# Patient Record
Sex: MALE | Race: WHITE | Employment: OTHER | URBAN - METROPOLITAN AREA
[De-identification: names, ages, dates, MRNs, and addresses within clinical notes are randomized per-mention and may not be internally consistent; named-entity substitution may affect disease eponyms.]

---

## 2017-02-16 ENCOUNTER — HOSPITAL ENCOUNTER (OUTPATIENT)
Dept: OTHER | Age: 82
Discharge: HOME | End: 2017-02-16
Attending: PSYCHIATRY & NEUROLOGY | Admitting: PSYCHIATRY & NEUROLOGY

## 2017-02-16 ENCOUNTER — OFFICE VISIT (OUTPATIENT)
Dept: NEUROLOGY | Age: 82
End: 2017-02-16

## 2017-02-16 VITALS
SYSTOLIC BLOOD PRESSURE: 125 MMHG | WEIGHT: 172 LBS | BODY MASS INDEX: 25.48 KG/M2 | HEIGHT: 69 IN | DIASTOLIC BLOOD PRESSURE: 59 MMHG | HEART RATE: 74 BPM

## 2017-02-16 DIAGNOSIS — G50.0 TRIGEMINAL NEURALGIA: ICD-10-CM

## 2017-02-16 DIAGNOSIS — G50.0 TRIGEMINAL NEURALGIA: Primary | ICD-10-CM

## 2017-02-16 LAB
ALT SERPL-CCNC: 36 U/L (ref 10–40)
AST SERPL-CCNC: 24 U/L (ref 15–37)
BASOPHILS ABSOLUTE: 0 K/UL (ref 0–0.2)
BASOPHILS RELATIVE PERCENT: 0.7 %
EOSINOPHILS ABSOLUTE: 0.2 K/UL (ref 0–0.6)
EOSINOPHILS RELATIVE PERCENT: 3.8 %
HCT VFR BLD CALC: 37.4 % (ref 40.5–52.5)
HEMOGLOBIN: 12.4 G/DL (ref 13.5–17.5)
LYMPHOCYTES ABSOLUTE: 1.2 K/UL (ref 1–5.1)
LYMPHOCYTES RELATIVE PERCENT: 20.2 %
MCH RBC QN AUTO: 32.1 PG (ref 26–34)
MCHC RBC AUTO-ENTMCNC: 33.1 G/DL (ref 31–36)
MCV RBC AUTO: 96.8 FL (ref 80–100)
MONOCYTES ABSOLUTE: 0.4 K/UL (ref 0–1.3)
MONOCYTES RELATIVE PERCENT: 6.5 %
NEUTROPHILS ABSOLUTE: 4.2 K/UL (ref 1.7–7.7)
NEUTROPHILS RELATIVE PERCENT: 68.8 %
PDW BLD-RTO: 13.4 % (ref 12.4–15.4)
PLATELET # BLD: 182 K/UL (ref 135–450)
PMV BLD AUTO: 8.4 FL (ref 5–10.5)
RBC # BLD: 3.87 M/UL (ref 4.2–5.9)
WBC # BLD: 6.1 K/UL (ref 4–11)

## 2017-02-16 PROCEDURE — G8484 FLU IMMUNIZE NO ADMIN: HCPCS | Performed by: PSYCHIATRY & NEUROLOGY

## 2017-02-16 PROCEDURE — 1036F TOBACCO NON-USER: CPT | Performed by: PSYCHIATRY & NEUROLOGY

## 2017-02-16 PROCEDURE — 1123F ACP DISCUSS/DSCN MKR DOCD: CPT | Performed by: PSYCHIATRY & NEUROLOGY

## 2017-02-16 PROCEDURE — 99213 OFFICE O/P EST LOW 20 MIN: CPT | Performed by: PSYCHIATRY & NEUROLOGY

## 2017-02-16 PROCEDURE — G8427 DOCREV CUR MEDS BY ELIG CLIN: HCPCS | Performed by: PSYCHIATRY & NEUROLOGY

## 2017-02-16 PROCEDURE — G8420 CALC BMI NORM PARAMETERS: HCPCS | Performed by: PSYCHIATRY & NEUROLOGY

## 2017-02-16 PROCEDURE — 4040F PNEUMOC VAC/ADMIN/RCVD: CPT | Performed by: PSYCHIATRY & NEUROLOGY

## 2017-02-16 RX ORDER — CARBAMAZEPINE 200 MG/1
TABLET ORAL
Qty: 270 TABLET | Refills: 3 | Status: SHIPPED | OUTPATIENT
Start: 2017-02-16 | End: 2017-09-11 | Stop reason: DRUGHIGH

## 2018-08-29 ENCOUNTER — OFFICE VISIT (OUTPATIENT)
Dept: ORTHOPEDIC SURGERY | Age: 83
End: 2018-08-29

## 2018-08-29 VITALS
WEIGHT: 163 LBS | BODY MASS INDEX: 24.14 KG/M2 | SYSTOLIC BLOOD PRESSURE: 118 MMHG | DIASTOLIC BLOOD PRESSURE: 77 MMHG | HEIGHT: 69 IN

## 2018-08-29 DIAGNOSIS — M48.061 DEGENERATIVE LUMBAR SPINAL STENOSIS: Primary | ICD-10-CM

## 2018-08-29 DIAGNOSIS — M54.16 LEFT LUMBAR RADICULITIS: ICD-10-CM

## 2018-08-29 DIAGNOSIS — E11.42 DIABETIC PERIPHERAL NEUROPATHY ASSOCIATED WITH TYPE 2 DIABETES MELLITUS (HCC): ICD-10-CM

## 2018-08-29 DIAGNOSIS — M54.50 LUMBAR PAIN: ICD-10-CM

## 2018-08-29 DIAGNOSIS — G95.19 NEUROGENIC CLAUDICATION (HCC): ICD-10-CM

## 2018-08-29 PROCEDURE — 1036F TOBACCO NON-USER: CPT | Performed by: INTERNAL MEDICINE

## 2018-08-29 PROCEDURE — G8420 CALC BMI NORM PARAMETERS: HCPCS | Performed by: INTERNAL MEDICINE

## 2018-08-29 PROCEDURE — G8427 DOCREV CUR MEDS BY ELIG CLIN: HCPCS | Performed by: INTERNAL MEDICINE

## 2018-08-29 PROCEDURE — 1101F PT FALLS ASSESS-DOCD LE1/YR: CPT | Performed by: INTERNAL MEDICINE

## 2018-08-29 PROCEDURE — 4040F PNEUMOC VAC/ADMIN/RCVD: CPT | Performed by: INTERNAL MEDICINE

## 2018-08-29 PROCEDURE — 99203 OFFICE O/P NEW LOW 30 MIN: CPT | Performed by: INTERNAL MEDICINE

## 2018-08-29 PROCEDURE — 1123F ACP DISCUSS/DSCN MKR DOCD: CPT | Performed by: INTERNAL MEDICINE

## 2018-08-29 RX ORDER — CILOSTAZOL 100 MG/1
100 TABLET ORAL
COMMUNITY
Start: 2018-01-22

## 2018-08-29 RX ORDER — GLIPIZIDE 5 MG/1
TABLET, FILM COATED, EXTENDED RELEASE ORAL
COMMUNITY
Start: 2018-01-14

## 2018-08-29 RX ORDER — TAMSULOSIN HYDROCHLORIDE 0.4 MG/1
CAPSULE ORAL
COMMUNITY
Start: 2018-01-10

## 2018-08-29 RX ORDER — TRAMADOL HYDROCHLORIDE 50 MG/1
50 TABLET ORAL
COMMUNITY
Start: 2018-08-28 | End: 2018-09-07 | Stop reason: SDUPTHER

## 2018-08-29 RX ORDER — METHYLPREDNISOLONE 4 MG/1
TABLET ORAL
Qty: 1 KIT | Refills: 0 | Status: SHIPPED | OUTPATIENT
Start: 2018-08-29

## 2018-08-29 NOTE — PROGRESS NOTES
tablet 1    diclofenac (VOLTAREN) 75 MG EC tablet       levothyroxine (SYNTHROID) 75 MCG tablet       lisinopril (PRINIVIL;ZESTRIL) 10 MG tablet       ASPIRIN by Does not apply route.  SIMVASTATIN PO Take  by mouth. No current facility-administered medications for this visit. Allergies:      No Known Allergies     Social History:         Social History     Social History    Marital status:      Spouse name: N/A    Number of children: N/A    Years of education: N/A     Occupational History    Not on file. Social History Main Topics    Smoking status: Former Smoker    Smokeless tobacco: Not on file    Alcohol use Yes      Comment: rarely    Drug use: No    Sexual activity: Not on file     Other Topics Concern    Not on file     Social History Narrative    No narrative on file        Review of Symptoms:    Pertinent items are noted in HPI    Review of systems reviewed from Patient History Form dated on today's date and   available in the patient's chart under the Media tab. Vital Signs:      Vitals:    08/29/18 1415   BP: 118/77        General Exam:     Constitutional: Patient is adequately groomed with no evidence of malnutrition  Mental Status: The patient is oriented to time, place and person. The patient's mood and affect are appropriate. Vascular: Examination reveals no swelling or calf tenderness. Peripheral pulses are palpable and 2+. Lymphatics: no lymphadenopathy of the inguinal region or lower extremity      Physical Exam: lower back      Primary Exam:    Inspection:  Loss of lordosis no atrophy or appreciable curvature      Palpation:  No focal trigger point tenderness      Range of Motion:  8885/5      Strength:  Normal lower extremity      Special Tests:  Negative SLR      Skin: There are no rashes, ulcerations or lesions.       Gait: minimally antalgic      Reflex intact lower     Additional Comments:        Additional Examinations:           Right PANEL/\">  Specimen: Blood - Blood, Venous\")'>216 (H) View Detailed Result Report  BASIC METABOLIC PANEL  14/69/8755\")' href=\"[email or web address] METABOLIC PANEL/\">  Specimen: Blood - Blood, Venous\")'>200 (H)         Other Outside Imaging and Testing Personally Reviewed:              Assessment   Impression: . Encounter Diagnoses   Name Primary?  Degenerative lumbar spinal stenosis Yes    Neurogenic claudication     Left lumbar radiculitis     Diabetic peripheral neuropathy associated with type 2 diabetes mellitus (HCC)     Lumbar pain               Plan: Activity modification cautioned against overuse   trial of steroids-Medrol Dosepak for hopeful therapeutic benefit  Caution with respect to hypoglycemia as a potential side effect of steroid-notify M.D. For blood sugar over 300  MRI evaluation of the lumbar spine and consider him a candidate for lumbar spine intervention injection/epidural when necessary if symptoms persist show no improvement or worsen    Consider formal course of PT when necessary  The nature of the finding, probable diagnosis and likely treatment was thoroughly discussed with the patient. The options, risks, complications, alternative treatment as well as some of the differential diagnosis was discussed. The patient was thoroughly informed and all questions were answered. the patient indicated understanding and satisfaction with the discussion. Orders:        Orders Placed This Encounter   Procedures    XR LUMBAR SPINE (2-3 VIEWS)           Disclaimer: \"This note was dictated with voice recognition software. Though review and correction are routine, we apologize for any errors. \"

## 2018-09-07 ENCOUNTER — TELEPHONE (OUTPATIENT)
Dept: ORTHOPEDIC SURGERY | Age: 83
End: 2018-09-07

## 2018-09-07 DIAGNOSIS — M48.061 DEGENERATIVE LUMBAR SPINAL STENOSIS: Primary | ICD-10-CM

## 2018-09-07 RX ORDER — TRAMADOL HYDROCHLORIDE 50 MG/1
50 TABLET ORAL EVERY 6 HOURS PRN
Qty: 20 TABLET | Refills: 0 | Status: SHIPPED | OUTPATIENT
Start: 2018-09-07 | End: 2018-09-14

## 2018-09-07 NOTE — TELEPHONE ENCOUNTER
Mr. Breezy Leone is having inceased pain at night, he moved his appt to Monday, and would like a refill on tramadol

## 2018-09-10 ENCOUNTER — OFFICE VISIT (OUTPATIENT)
Dept: ORTHOPEDIC SURGERY | Age: 83
End: 2018-09-10

## 2018-09-10 VITALS — WEIGHT: 163 LBS | BODY MASS INDEX: 24.14 KG/M2 | HEIGHT: 69 IN

## 2018-09-10 DIAGNOSIS — G95.19 NEUROGENIC CLAUDICATION (HCC): ICD-10-CM

## 2018-09-10 DIAGNOSIS — E11.42 DIABETIC PERIPHERAL NEUROPATHY ASSOCIATED WITH TYPE 2 DIABETES MELLITUS (HCC): ICD-10-CM

## 2018-09-10 DIAGNOSIS — M48.061 DEGENERATIVE LUMBAR SPINAL STENOSIS: Primary | ICD-10-CM

## 2018-09-10 DIAGNOSIS — M54.50 LUMBAR PAIN: ICD-10-CM

## 2018-09-10 DIAGNOSIS — M54.16 LEFT LUMBAR RADICULITIS: ICD-10-CM

## 2018-09-10 PROCEDURE — G8420 CALC BMI NORM PARAMETERS: HCPCS | Performed by: INTERNAL MEDICINE

## 2018-09-10 PROCEDURE — 1123F ACP DISCUSS/DSCN MKR DOCD: CPT | Performed by: INTERNAL MEDICINE

## 2018-09-10 PROCEDURE — G8427 DOCREV CUR MEDS BY ELIG CLIN: HCPCS | Performed by: INTERNAL MEDICINE

## 2018-09-10 PROCEDURE — 1036F TOBACCO NON-USER: CPT | Performed by: INTERNAL MEDICINE

## 2018-09-10 PROCEDURE — 1101F PT FALLS ASSESS-DOCD LE1/YR: CPT | Performed by: INTERNAL MEDICINE

## 2018-09-10 PROCEDURE — 99213 OFFICE O/P EST LOW 20 MIN: CPT | Performed by: INTERNAL MEDICINE

## 2018-09-10 PROCEDURE — 4040F PNEUMOC VAC/ADMIN/RCVD: CPT | Performed by: INTERNAL MEDICINE

## 2018-09-11 ENCOUNTER — TELEPHONE (OUTPATIENT)
Dept: ORTHOPEDIC SURGERY | Age: 83
End: 2018-09-11

## 2018-09-11 DIAGNOSIS — M54.50 LUMBAR PAIN: Primary | ICD-10-CM

## 2018-09-11 NOTE — TELEPHONE ENCOUNTER
patient called stating he cannot get MRI due to pacemaker, wanting to know what to do at the moment.

## 2018-09-12 ENCOUNTER — TELEPHONE (OUTPATIENT)
Dept: ORTHOPEDIC SURGERY | Age: 83
End: 2018-09-12

## 2018-09-12 NOTE — PROGRESS NOTES
facility-administered medications for this visit. Allergies:      No Known Allergies        Review of Systems:    Pertinent items are noted in HPI       Vital Signs: There were no vitals filed for this visit. General Exam:     Constitutional: Patient is adequately groomed with no evidence of malnutrition    Physical Exam: lower back      Primary Exam:    Inspection:  No deformity atrophy or appreciable curvature      Palpation:  No focal trigger point tenderness      Range of Motion:  80-85/5      Strength:  Normal lower extremity      Special Tests:  Negative SLR      Skin: There are no rashes, ulcerations or lesions. Gait: nonantalgic     Neurovascular - non focal and intact       Additional Comments:        Additional Examinations:                   Office Imaging Results/Procedures PerformedToday:           Office Procedures:     Orders Placed This Encounter   Procedures    MRI Lumbar Spine WO Contrast     Standing Status:   Future     Standing Expiration Date:   9/10/2019     Scheduling Instructions:      Zack Perez 9/11/18     Order Specific Question:   Reason for exam:     Answer:   r/o stenosis           Other Outside Imaging and Testing Personally Reviewed:       none          Assessment   Impression: . Encounter Diagnoses   Name Primary?     Degenerative lumbar spinal stenosis Yes    Neurogenic claudication     Left lumbar radiculitis     Diabetic peripheral neuropathy associated with type 2 diabetes mellitus (HCC)     Lumbar pain               Plan:       Continue medical pain management as per previous-when necessary use of tramadol  CT scan lumbar spine evaluate severity of spinal stenosis suspected clinically MRI contraindicated relative to pacemaker  Consider him a candidate for lumbar spine intervention injection-epidural pending results of CT scan       Orders:        Orders Placed This Encounter   Procedures    MRI Lumbar Spine WO Contrast     Standing Status:

## 2018-09-13 ENCOUNTER — TELEPHONE (OUTPATIENT)
Dept: ORTHOPEDIC SURGERY | Age: 83
End: 2018-09-13

## 2018-09-13 NOTE — TELEPHONE ENCOUNTER
called patient back letting him know we are okay with doing MRI, however [facility] will not do the MRI even with clearance from cardiologist but . [facility] may be able to do MRI but they are in need of make and model number to confirm. Explained to patient I reached out to his cardiologist Dr. Duke Prader to get that information and I would be in contact with him to let him know when we get the go ahead to do the MRI at [facility]

## 2018-09-19 ENCOUNTER — TELEPHONE (OUTPATIENT)
Dept: ORTHOPEDIC SURGERY | Age: 83
End: 2018-09-19

## 2018-09-19 NOTE — TELEPHONE ENCOUNTER
returned patients' call, let him know we were still waiting for Dr. Cammie Singleton office to fill out the paperwork to move forward with MRI.

## 2018-09-21 ENCOUNTER — TELEPHONE (OUTPATIENT)
Dept: ORTHOPEDIC SURGERY | Age: 83
End: 2018-09-21

## 2018-09-21 NOTE — TELEPHONE ENCOUNTER
Spoke with Dr. Mast Estimstephon office with [facility], since they haven't seen patient in multiple years and Dr. Kwabena Drake has taken over case, they are unable to fill out the proper paperwork for MRI

## 2018-09-21 NOTE — TELEPHONE ENCOUNTER
called Dr. Collette Plump office left a message on MA voicemail to give me a call back to figure out how we can get proper paperwork completed to get pts MRI done ASAP.

## 2018-09-24 ENCOUNTER — TELEPHONE (OUTPATIENT)
Dept: ORTHOPEDIC SURGERY | Age: 83
End: 2018-09-24

## 2018-09-24 NOTE — TELEPHONE ENCOUNTER
Julieta Neri from Dr. Linda Mena office left message on my voicemail stating they are waiting to receive the paperwork to fill out in order to move forward with the MRI

## 2018-09-25 ENCOUNTER — TELEPHONE (OUTPATIENT)
Dept: ORTHOPEDIC SURGERY | Age: 83
End: 2018-09-25

## 2018-09-25 NOTE — TELEPHONE ENCOUNTER
Ruthann Estrada. [place] left message on my voicemail confirming paperwork was correctly filled out by cardiologist Dr María Torres and we can move forward with MRI.  They will call patient to schedule that to be done

## 2018-09-28 ENCOUNTER — TELEPHONE (OUTPATIENT)
Dept: ORTHOPEDIC SURGERY | Age: 83
End: 2018-09-28

## 2018-10-03 ENCOUNTER — OFFICE VISIT (OUTPATIENT)
Dept: ORTHOPEDIC SURGERY | Age: 83
End: 2018-10-03
Payer: MEDICARE

## 2018-10-03 VITALS — WEIGHT: 160 LBS | BODY MASS INDEX: 23.7 KG/M2 | HEIGHT: 69 IN

## 2018-10-03 DIAGNOSIS — M48.061 DEGENERATIVE LUMBAR SPINAL STENOSIS: Primary | ICD-10-CM

## 2018-10-03 DIAGNOSIS — M54.16 LEFT LUMBAR RADICULITIS: ICD-10-CM

## 2018-10-03 DIAGNOSIS — E11.42 DIABETIC PERIPHERAL NEUROPATHY ASSOCIATED WITH TYPE 2 DIABETES MELLITUS (HCC): ICD-10-CM

## 2018-10-03 DIAGNOSIS — G95.19 NEUROGENIC CLAUDICATION (HCC): ICD-10-CM

## 2018-10-03 PROCEDURE — G8484 FLU IMMUNIZE NO ADMIN: HCPCS | Performed by: INTERNAL MEDICINE

## 2018-10-03 PROCEDURE — 1036F TOBACCO NON-USER: CPT | Performed by: INTERNAL MEDICINE

## 2018-10-03 PROCEDURE — 1101F PT FALLS ASSESS-DOCD LE1/YR: CPT | Performed by: INTERNAL MEDICINE

## 2018-10-03 PROCEDURE — 1123F ACP DISCUSS/DSCN MKR DOCD: CPT | Performed by: INTERNAL MEDICINE

## 2018-10-03 PROCEDURE — G8420 CALC BMI NORM PARAMETERS: HCPCS | Performed by: INTERNAL MEDICINE

## 2018-10-03 PROCEDURE — G8427 DOCREV CUR MEDS BY ELIG CLIN: HCPCS | Performed by: INTERNAL MEDICINE

## 2018-10-03 PROCEDURE — 99213 OFFICE O/P EST LOW 20 MIN: CPT | Performed by: INTERNAL MEDICINE

## 2018-10-03 PROCEDURE — 4040F PNEUMOC VAC/ADMIN/RCVD: CPT | Performed by: INTERNAL MEDICINE

## 2018-10-06 PROBLEM — G95.19 NEUROGENIC CLAUDICATION (HCC): Status: ACTIVE | Noted: 2018-10-06

## 2018-10-06 PROBLEM — M48.061 DEGENERATIVE LUMBAR SPINAL STENOSIS: Status: ACTIVE | Noted: 2018-10-06

## 2018-10-06 PROBLEM — E11.42 DIABETIC PERIPHERAL NEUROPATHY ASSOCIATED WITH TYPE 2 DIABETES MELLITUS (HCC): Status: ACTIVE | Noted: 2018-10-06

## 2018-10-06 PROBLEM — M54.16 LEFT LUMBAR RADICULITIS: Status: ACTIVE | Noted: 2018-10-06

## 2019-02-19 ENCOUNTER — TELEPHONE (OUTPATIENT)
Dept: ORTHOPEDIC SURGERY | Age: 84
End: 2019-02-19

## 2019-02-19 ENCOUNTER — OFFICE VISIT (OUTPATIENT)
Dept: ORTHOPEDIC SURGERY | Age: 84
End: 2019-02-19
Payer: MEDICARE

## 2019-02-19 DIAGNOSIS — M54.59 MECHANICAL LOW BACK PAIN: Primary | ICD-10-CM

## 2019-02-19 DIAGNOSIS — M48.061 DEGENERATIVE LUMBAR SPINAL STENOSIS: ICD-10-CM

## 2019-02-19 DIAGNOSIS — M51.36 DDD (DEGENERATIVE DISC DISEASE), LUMBAR: ICD-10-CM

## 2019-02-19 DIAGNOSIS — E11.42 DIABETIC PERIPHERAL NEUROPATHY ASSOCIATED WITH TYPE 2 DIABETES MELLITUS (HCC): ICD-10-CM

## 2019-02-19 PROCEDURE — 1123F ACP DISCUSS/DSCN MKR DOCD: CPT | Performed by: INTERNAL MEDICINE

## 2019-02-19 PROCEDURE — G8427 DOCREV CUR MEDS BY ELIG CLIN: HCPCS | Performed by: INTERNAL MEDICINE

## 2019-02-19 PROCEDURE — 99214 OFFICE O/P EST MOD 30 MIN: CPT | Performed by: INTERNAL MEDICINE

## 2019-02-19 PROCEDURE — 1101F PT FALLS ASSESS-DOCD LE1/YR: CPT | Performed by: INTERNAL MEDICINE

## 2019-02-19 PROCEDURE — 4040F PNEUMOC VAC/ADMIN/RCVD: CPT | Performed by: INTERNAL MEDICINE

## 2019-02-19 PROCEDURE — G8420 CALC BMI NORM PARAMETERS: HCPCS | Performed by: INTERNAL MEDICINE

## 2019-02-19 PROCEDURE — 1036F TOBACCO NON-USER: CPT | Performed by: INTERNAL MEDICINE

## 2019-02-19 PROCEDURE — L0642 LO SAG RI AN/POS PNL PRE OTS: HCPCS | Performed by: INTERNAL MEDICINE

## 2019-02-19 PROCEDURE — G8484 FLU IMMUNIZE NO ADMIN: HCPCS | Performed by: INTERNAL MEDICINE

## 2019-03-05 ENCOUNTER — TELEPHONE (OUTPATIENT)
Dept: ORTHOPEDIC SURGERY | Age: 84
End: 2019-03-05

## 2019-03-28 ENCOUNTER — OFFICE VISIT (OUTPATIENT)
Dept: NEUROLOGY | Age: 84
End: 2019-03-28
Payer: MEDICARE

## 2019-03-28 VITALS
DIASTOLIC BLOOD PRESSURE: 64 MMHG | WEIGHT: 162.4 LBS | BODY MASS INDEX: 24.05 KG/M2 | HEIGHT: 69 IN | HEART RATE: 59 BPM | SYSTOLIC BLOOD PRESSURE: 146 MMHG

## 2019-03-28 DIAGNOSIS — G50.0 TRIGEMINAL NEURALGIA: Primary | ICD-10-CM

## 2019-03-28 PROCEDURE — G8427 DOCREV CUR MEDS BY ELIG CLIN: HCPCS | Performed by: PSYCHIATRY & NEUROLOGY

## 2019-03-28 PROCEDURE — 1036F TOBACCO NON-USER: CPT | Performed by: PSYCHIATRY & NEUROLOGY

## 2019-03-28 PROCEDURE — G8484 FLU IMMUNIZE NO ADMIN: HCPCS | Performed by: PSYCHIATRY & NEUROLOGY

## 2019-03-28 PROCEDURE — 4040F PNEUMOC VAC/ADMIN/RCVD: CPT | Performed by: PSYCHIATRY & NEUROLOGY

## 2019-03-28 PROCEDURE — G8420 CALC BMI NORM PARAMETERS: HCPCS | Performed by: PSYCHIATRY & NEUROLOGY

## 2019-03-28 PROCEDURE — 99213 OFFICE O/P EST LOW 20 MIN: CPT | Performed by: PSYCHIATRY & NEUROLOGY

## 2019-03-28 PROCEDURE — 1123F ACP DISCUSS/DSCN MKR DOCD: CPT | Performed by: PSYCHIATRY & NEUROLOGY

## 2021-04-13 ENCOUNTER — OFFICE VISIT (OUTPATIENT)
Dept: NEUROLOGY | Age: 86
End: 2021-04-13
Payer: MEDICARE

## 2021-04-13 VITALS
BODY MASS INDEX: 24.29 KG/M2 | TEMPERATURE: 98.3 F | HEART RATE: 82 BPM | DIASTOLIC BLOOD PRESSURE: 75 MMHG | HEIGHT: 69 IN | WEIGHT: 164 LBS | SYSTOLIC BLOOD PRESSURE: 133 MMHG

## 2021-04-13 DIAGNOSIS — G50.0 TRIGEMINAL NEURALGIA: Primary | ICD-10-CM

## 2021-04-13 PROCEDURE — 99214 OFFICE O/P EST MOD 30 MIN: CPT | Performed by: PSYCHIATRY & NEUROLOGY

## 2021-04-13 PROCEDURE — G8420 CALC BMI NORM PARAMETERS: HCPCS | Performed by: PSYCHIATRY & NEUROLOGY

## 2021-04-13 PROCEDURE — 1036F TOBACCO NON-USER: CPT | Performed by: PSYCHIATRY & NEUROLOGY

## 2021-04-13 PROCEDURE — G8427 DOCREV CUR MEDS BY ELIG CLIN: HCPCS | Performed by: PSYCHIATRY & NEUROLOGY

## 2021-04-13 PROCEDURE — 1123F ACP DISCUSS/DSCN MKR DOCD: CPT | Performed by: PSYCHIATRY & NEUROLOGY

## 2021-04-13 PROCEDURE — 4040F PNEUMOC VAC/ADMIN/RCVD: CPT | Performed by: PSYCHIATRY & NEUROLOGY

## 2021-04-13 RX ORDER — CARBAMAZEPINE 200 MG/1
TABLET ORAL
Qty: 60 TABLET | Refills: 1 | Status: SHIPPED | OUTPATIENT
Start: 2021-04-13 | End: 2021-05-25 | Stop reason: DRUGHIGH

## 2021-04-15 ENCOUNTER — HOSPITAL ENCOUNTER (OUTPATIENT)
Age: 86
Discharge: HOME | End: 2021-04-15
Payer: MEDICARE

## 2021-04-15 ENCOUNTER — TELEPHONE (OUTPATIENT)
Dept: NEUROLOGY | Age: 86
End: 2021-04-15

## 2021-04-15 DIAGNOSIS — G50.0 TRIGEMINAL NEURALGIA: Primary | ICD-10-CM

## 2021-04-15 DIAGNOSIS — G50.0 TRIGEMINAL NEURALGIA: ICD-10-CM

## 2021-04-15 LAB
CARBAMAZEPINE DOSE: ABNORMAL
CARBAMAZEPINE LEVEL: 3.9 UG/ML (ref 4–12)

## 2021-04-15 PROCEDURE — 36415 COLL VENOUS BLD VENIPUNCTURE: CPT

## 2021-04-15 PROCEDURE — 80156 ASSAY CARBAMAZEPINE TOTAL: CPT

## 2021-04-15 NOTE — TELEPHONE ENCOUNTER
Talked to son  Rajwinder Cook and advised I put in another lab order and he wants it mailed to his house. Call complete.

## 2021-04-15 NOTE — TELEPHONE ENCOUNTER
Pt was seen in office yesterday and was given an order to have lab work done in 5 weeks. The Pt miss understood and went and had the lab work done today. The PT's son would like to know if he can get another lab order to have the blood work done in 5 weeks. Call Pt's son and advise.

## 2021-05-11 LAB — CARBAMAZEPINE LEVEL: 6.6 UG/ML (ref 4–12)

## 2021-05-25 ENCOUNTER — OFFICE VISIT (OUTPATIENT)
Dept: NEUROLOGY | Age: 86
End: 2021-05-25
Payer: MEDICARE

## 2021-05-25 VITALS
SYSTOLIC BLOOD PRESSURE: 145 MMHG | WEIGHT: 164 LBS | HEIGHT: 69 IN | BODY MASS INDEX: 24.29 KG/M2 | HEART RATE: 69 BPM | DIASTOLIC BLOOD PRESSURE: 69 MMHG

## 2021-05-25 DIAGNOSIS — G50.0 TRIGEMINAL NEURALGIA: Primary | ICD-10-CM

## 2021-05-25 PROCEDURE — 1036F TOBACCO NON-USER: CPT | Performed by: PSYCHIATRY & NEUROLOGY

## 2021-05-25 PROCEDURE — 4040F PNEUMOC VAC/ADMIN/RCVD: CPT | Performed by: PSYCHIATRY & NEUROLOGY

## 2021-05-25 PROCEDURE — 99213 OFFICE O/P EST LOW 20 MIN: CPT | Performed by: PSYCHIATRY & NEUROLOGY

## 2021-05-25 PROCEDURE — G8420 CALC BMI NORM PARAMETERS: HCPCS | Performed by: PSYCHIATRY & NEUROLOGY

## 2021-05-25 PROCEDURE — 1123F ACP DISCUSS/DSCN MKR DOCD: CPT | Performed by: PSYCHIATRY & NEUROLOGY

## 2021-05-25 PROCEDURE — G8427 DOCREV CUR MEDS BY ELIG CLIN: HCPCS | Performed by: PSYCHIATRY & NEUROLOGY

## 2021-05-25 RX ORDER — CARBAMAZEPINE 200 MG/1
100 TABLET ORAL 2 TIMES DAILY
Qty: 30 TABLET | Refills: 2 | Status: SHIPPED
Start: 2021-05-25 | End: 2021-05-27 | Stop reason: SDUPTHER

## 2021-05-25 NOTE — PROGRESS NOTES
Mary Nielsen   Neurology followup    Subjective:   CC/HP  History was obtained from the patient. Patient has known trigeminal neuralgia. It  usually involves the right maxillary and mandibular divisions. Patient is on Epitol 200 mg, half a tablet twice a day and now the pain is under control. He seems to be in remission without any side effects to medication. REVIEW OF SYSTEMS    Constitutional:  []   Chills   []  Fatigue   []  Fevers   []  Malaise   []  Weight loss     [x] Denies all of the above    Respiratory:   []  Cough    []  Shortness of breath         [x] Denies all of the above     Cardiovascular:   []  Chest pain    []  Exertional chest pressure/discomfort           [] Palpitations    []  Syncope     [x] Denies all of the above        Past Medical History:   Diagnosis Date    Hyperlipidemia     Trigeminal neuralgia      Family History   Family history unknown: Yes     Social History     Socioeconomic History    Marital status:      Spouse name: None    Number of children: None    Years of education: None    Highest education level: None   Occupational History    None   Tobacco Use    Smoking status: Former Smoker    Smokeless tobacco: Never Used   Substance and Sexual Activity    Alcohol use: Yes     Comment: rarely    Drug use: No    Sexual activity: None   Other Topics Concern    None   Social History Narrative    None     Social Determinants of Health     Financial Resource Strain:     Difficulty of Paying Living Expenses:    Food Insecurity:     Worried About Running Out of Food in the Last Year:     Ran Out of Food in the Last Year:    Transportation Needs:     Lack of Transportation (Medical):      Lack of Transportation (Non-Medical):    Physical Activity:     Days of Exercise per Week:     Minutes of Exercise per Session:    Stress:     Feeling of Stress :    Social Connections:     Frequency of Communication with Friends and Family:     Frequency of Social Gatherings with Friends and Family:     Attends Anabaptism Services:     Active Member of Clubs or Organizations:     Attends Club or Organization Meetings:     Marital Status:    Intimate Partner Violence:     Fear of Current or Ex-Partner:     Emotionally Abused:     Physically Abused:     Sexually Abused:         Objective:  Exam:  BP (!) 145/69   Pulse 69   Ht 5' 9\" (1.753 m)   Wt 164 lb (74.4 kg)   BMI 24.22 kg/m²   This is a well-nourished patient in no acute distress  Patient is awake, alert and oriented x3. Speech is normal.  Pupils are equal round reacting to light. Extraocular movements intact. Face symmetrical. Tongue midline. Motor exam shows normal symmetrical strength. Deep tendon reflexes normal. Plantar reflexes downgoing. Sensory exam normal. Coordination normal. Gait normal. No carotid bruit. No neck stiffness. Impression :  Trigeminal neuralgia involving the right side, symptoms well controlled at this time. Plan :  Discussed with patient and her son  Continue Epitol 200 mg, half a tablet twice daily  I will see him back in 3 months. If he seems to be in complete remission we can try tapering him off the medication at that point. Please note a portion of  this chart was generated using dragon dictation software. Although every effort was made to ensure the accuracy of this automated transcription, some errors in transcription may have occurred.

## 2021-05-27 ENCOUNTER — TELEPHONE (OUTPATIENT)
Dept: NEUROLOGY | Age: 86
End: 2021-05-27

## 2021-05-27 DIAGNOSIS — G50.0 TRIGEMINAL NEURALGIA: Primary | ICD-10-CM

## 2021-05-27 RX ORDER — CARBAMAZEPINE 200 MG/1
100 TABLET ORAL 2 TIMES DAILY
Qty: 90 TABLET | Refills: 0 | Status: SHIPPED | OUTPATIENT
Start: 2021-05-27 | End: 2021-07-28

## 2021-05-27 NOTE — TELEPHONE ENCOUNTER
PT would like to have a refill of Carbamazepine 200mg QTY 90 days supplies called into [facility] mail in service.   PT would like to have a call when this medication has been phoned in

## 2021-07-28 DIAGNOSIS — G50.0 TRIGEMINAL NEURALGIA: ICD-10-CM

## 2021-07-28 RX ORDER — CARBAMAZEPINE 200 MG/1
TABLET ORAL
Qty: 90 TABLET | Refills: 3 | Status: SHIPPED | OUTPATIENT
Start: 2021-07-28 | End: 2022-08-16

## 2021-08-24 ENCOUNTER — OFFICE VISIT (OUTPATIENT)
Dept: NEUROLOGY | Age: 86
End: 2021-08-24
Payer: MEDICARE

## 2021-08-24 VITALS
WEIGHT: 150.2 LBS | DIASTOLIC BLOOD PRESSURE: 58 MMHG | HEART RATE: 60 BPM | SYSTOLIC BLOOD PRESSURE: 100 MMHG | BODY MASS INDEX: 22.18 KG/M2

## 2021-08-24 DIAGNOSIS — G50.0 TRIGEMINAL NEURALGIA: Primary | ICD-10-CM

## 2021-08-24 PROCEDURE — 99213 OFFICE O/P EST LOW 20 MIN: CPT | Performed by: PSYCHIATRY & NEUROLOGY

## 2021-08-24 PROCEDURE — 4040F PNEUMOC VAC/ADMIN/RCVD: CPT | Performed by: PSYCHIATRY & NEUROLOGY

## 2021-08-24 PROCEDURE — G8427 DOCREV CUR MEDS BY ELIG CLIN: HCPCS | Performed by: PSYCHIATRY & NEUROLOGY

## 2021-08-24 PROCEDURE — G8420 CALC BMI NORM PARAMETERS: HCPCS | Performed by: PSYCHIATRY & NEUROLOGY

## 2021-08-24 PROCEDURE — 1036F TOBACCO NON-USER: CPT | Performed by: PSYCHIATRY & NEUROLOGY

## 2021-08-24 PROCEDURE — 1123F ACP DISCUSS/DSCN MKR DOCD: CPT | Performed by: PSYCHIATRY & NEUROLOGY

## 2021-08-24 NOTE — PROGRESS NOTES
Viviana Soriano   Neurology followup    Subjective:   CC/HP  History was obtained from the patient. Patient has known trigeminal neuralgia. It  usually involves the right maxillary and mandibular divisions. Patient is on Epitol 200 mg, half a tablet twice a day and now the pain is under control. Patient states that he has occasional jab of pain maybe once a week but it is reasonably under control  REVIEW OF SYSTEMS    Constitutional:  []   Chills   []  Fatigue   []  Fevers   []  Malaise   []  Weight loss     [x] Denies all of the above    Respiratory:   []  Cough    []  Shortness of breath         [x] Denies all of the above     Cardiovascular:   []  Chest pain    []  Exertional chest pressure/discomfort           [] Palpitations    []  Syncope     [x] Denies all of the above        Past Medical History:   Diagnosis Date    Hyperlipidemia     Trigeminal neuralgia      Family History   Family history unknown: Yes     Social History     Socioeconomic History    Marital status:      Spouse name: Not on file    Number of children: Not on file    Years of education: Not on file    Highest education level: Not on file   Occupational History    Not on file   Tobacco Use    Smoking status: Former Smoker    Smokeless tobacco: Never Used   Substance and Sexual Activity    Alcohol use: Yes     Comment: rarely    Drug use: No    Sexual activity: Not on file   Other Topics Concern    Not on file   Social History Narrative    Not on file     Social Determinants of Health     Financial Resource Strain:     Difficulty of Paying Living Expenses:    Food Insecurity:     Worried About Running Out of Food in the Last Year:     [address] in the Last Year:    Transportation Needs:     Lack of Transportation (Medical):      Lack of Transportation (Non-Medical):    Physical Activity:     Days of Exercise per Week:     Minutes of Exercise per Session:    Stress:     Feeling of Stress :    Social Connections:     Frequency of Communication with Friends and Family:     Frequency of Social Gatherings with Friends and Family:     Attends Yarsanism Services:     Active Member of Clubs or Organizations:     Attends Club or Organization Meetings:     Marital Status:    Intimate Partner Violence:     Fear of Current or Ex-Partner:     Emotionally Abused:     Physically Abused:     Sexually Abused:         Objective:  Exam:  There were no vitals taken for this visit. This is a well-nourished patient in no acute distress  Patient is awake, alert and oriented x3. Speech is normal.  Pupils are equal round reacting to light. Extraocular movements intact. Face symmetrical. Tongue midline. Motor exam shows normal symmetrical strength. Deep tendon reflexes normal. Plantar reflexes downgoing. Sensory exam normal. Coordination normal. Gait normal. No carotid bruit. No neck stiffness. Impression :  Trigeminal neuralgia involving the right side, symptoms well controlled at this time. Plan :  Discussed with patient and her son  Continue Epitol 200 mg, half a tablet twice daily  If his pain gets worse we can increase the dose slightly. I will see him back in 6 months for follow-up. Please note a portion of  this chart was generated using dragon dictation software. Although every effort was made to ensure the accuracy of this automated transcription, some errors in transcription may have occurred.

## 2022-03-07 ENCOUNTER — OFFICE VISIT (OUTPATIENT)
Dept: NEUROLOGY | Age: 87
End: 2022-03-07
Payer: MEDICARE

## 2022-03-07 VITALS
WEIGHT: 150 LBS | DIASTOLIC BLOOD PRESSURE: 64 MMHG | HEART RATE: 61 BPM | HEIGHT: 69 IN | BODY MASS INDEX: 22.22 KG/M2 | SYSTOLIC BLOOD PRESSURE: 115 MMHG

## 2022-03-07 DIAGNOSIS — G50.0 TRIGEMINAL NEURALGIA: ICD-10-CM

## 2022-03-07 PROCEDURE — 1036F TOBACCO NON-USER: CPT | Performed by: PSYCHIATRY & NEUROLOGY

## 2022-03-07 PROCEDURE — G8484 FLU IMMUNIZE NO ADMIN: HCPCS | Performed by: PSYCHIATRY & NEUROLOGY

## 2022-03-07 PROCEDURE — G8420 CALC BMI NORM PARAMETERS: HCPCS | Performed by: PSYCHIATRY & NEUROLOGY

## 2022-03-07 PROCEDURE — 4040F PNEUMOC VAC/ADMIN/RCVD: CPT | Performed by: PSYCHIATRY & NEUROLOGY

## 2022-03-07 PROCEDURE — 99213 OFFICE O/P EST LOW 20 MIN: CPT | Performed by: PSYCHIATRY & NEUROLOGY

## 2022-03-07 PROCEDURE — G8427 DOCREV CUR MEDS BY ELIG CLIN: HCPCS | Performed by: PSYCHIATRY & NEUROLOGY

## 2022-03-07 PROCEDURE — 1123F ACP DISCUSS/DSCN MKR DOCD: CPT | Performed by: PSYCHIATRY & NEUROLOGY

## 2022-03-07 RX ORDER — FINASTERIDE 5 MG/1
5 TABLET, FILM COATED ORAL DAILY
COMMUNITY

## 2022-03-07 NOTE — PROGRESS NOTES
Jarrod Pires   Neurology followup    Subjective:   CC/HP  History was obtained from the patient. Patient has known trigeminal neuralgia. It  usually involves the right maxillary and mandibular divisions. Patient was on Lipitor but is not taking it anymore according to the nursing home. Patient states that he has occasional jab of pain maybe once a week but it is reasonably under control  REVIEW OF SYSTEMS    Constitutional:  []   Chills   []  Fatigue   []  Fevers   []  Malaise   []  Weight loss     [x] Denies all of the above    Respiratory:   []  Cough    []  Shortness of breath         [x] Denies all of the above     Cardiovascular:   []  Chest pain    []  Exertional chest pressure/discomfort           [] Palpitations    []  Syncope     [x] Denies all of the above        Past Medical History:   Diagnosis Date    Hyperlipidemia     Trigeminal neuralgia      Family History   Family history unknown: Yes     Social History     Socioeconomic History    Marital status:      Spouse name: Not on file    Number of children: Not on file    Years of education: Not on file    Highest education level: Not on file   Occupational History    Not on file   Tobacco Use    Smoking status: Former Smoker    Smokeless tobacco: Never Used   Substance and Sexual Activity    Alcohol use: Yes     Comment: rarely    Drug use: No    Sexual activity: Not on file   Other Topics Concern    Not on file   Social History Narrative    Not on file     Social Determinants of Health     Financial Resource Strain:     Difficulty of Paying Living Expenses: Not on file   Food Insecurity:     Worried About [address] in the Last Year: Not on file    [place] of Food in the Last Year: Not on file   Transportation Needs:     Lack of Transportation (Medical): Not on file    Lack of Transportation (Non-Medical):  Not on file   Physical Activity:     Days of Exercise per Week: Not on file    Minutes of Exercise per Session: Not on file   Stress:     Feeling of Stress : Not on file   Social Connections:     Frequency of Communication with Friends and Family: Not on file    Frequency of Social Gatherings with Friends and Family: Not on file    Attends Christianity Services: Not on file    Active Member of Clubs or Organizations: Not on file    Attends Club or Organization Meetings: Not on file    Marital Status: Not on file   Intimate Partner Violence:     Fear of Current or Ex-Partner: Not on file    Emotionally Abused: Not on file    Physically Abused: Not on file    Sexually Abused: Not on file   Housing Stability:     Unable to Pay for Housing in the Last Year: Not on file    Number of [place] in the Last Year: Not on file    Unstable Housing in the Last Year: Not on file        Objective:  Exam:  /64   Pulse 61   Ht 5' 9\" (1.753 m)   Wt 150 lb (68 kg)   BMI 22.15 kg/m²   This is a well-nourished patient in no acute distress  Patient is awake, alert and oriented x3. Speech is normal.  Pupils are equal round reacting to light. Extraocular movements intact. Face symmetrical. Tongue midline. Motor exam shows normal symmetrical strength. Deep tendon reflexes normal. Plantar reflexes downgoing. Sensory exam normal. Coordination normal. Gait normal. No carotid bruit. No neck stiffness. Impression :  Trigeminal neuralgia involving the right side, symptoms well controlled at this time. Plan :  Discussed with patient and her son  Patient is not on Epitol at this time and is doing well. I will see him back in the ER. If the pain comes back we can start him back on the patella. Please note a portion of  this chart was generated using dragon dictation software. Although every effort was made to ensure the accuracy of this automated transcription, some errors in transcription may have occurred.

## 2022-08-15 ENCOUNTER — TELEPHONE (OUTPATIENT)
Dept: NEUROLOGY | Age: 87
End: 2022-08-15

## 2022-08-15 DIAGNOSIS — M48.061 DEGENERATIVE LUMBAR SPINAL STENOSIS: Primary | ICD-10-CM

## 2022-08-15 DIAGNOSIS — G50.0 TRIGEMINAL NEURALGIA: ICD-10-CM

## 2022-08-16 RX ORDER — CARBAMAZEPINE 200 MG/1
TABLET ORAL
Qty: 60 TABLET | Refills: 1 | Status: SHIPPED | OUTPATIENT
Start: 2022-08-16

## 2022-10-28 DIAGNOSIS — G50.0 TRIGEMINAL NEURALGIA: ICD-10-CM

## 2022-10-28 RX ORDER — CARBAMAZEPINE 200 MG/1
TABLET ORAL
Qty: 60 TABLET | Refills: 1 | OUTPATIENT
Start: 2022-10-28